# Patient Record
Sex: FEMALE | Race: WHITE | NOT HISPANIC OR LATINO
[De-identification: names, ages, dates, MRNs, and addresses within clinical notes are randomized per-mention and may not be internally consistent; named-entity substitution may affect disease eponyms.]

---

## 2017-02-13 PROBLEM — Z00.00 ENCOUNTER FOR PREVENTIVE HEALTH EXAMINATION: Status: ACTIVE | Noted: 2017-02-13

## 2017-02-23 ENCOUNTER — APPOINTMENT (OUTPATIENT)
Dept: CARDIOLOGY | Facility: CLINIC | Age: 67
End: 2017-02-23

## 2017-03-02 ENCOUNTER — APPOINTMENT (OUTPATIENT)
Dept: CARDIOLOGY | Facility: CLINIC | Age: 67
End: 2017-03-02

## 2017-03-03 ENCOUNTER — APPOINTMENT (OUTPATIENT)
Dept: CARDIOLOGY | Facility: CLINIC | Age: 67
End: 2017-03-03

## 2017-09-22 ENCOUNTER — APPOINTMENT (OUTPATIENT)
Dept: CARDIOLOGY | Facility: CLINIC | Age: 67
End: 2017-09-22
Payer: MEDICARE

## 2017-09-22 PROCEDURE — 99214 OFFICE O/P EST MOD 30 MIN: CPT

## 2021-12-27 ENCOUNTER — TRANSCRIPTION ENCOUNTER (OUTPATIENT)
Age: 71
End: 2021-12-27

## 2021-12-31 ENCOUNTER — TRANSCRIPTION ENCOUNTER (OUTPATIENT)
Age: 71
End: 2021-12-31

## 2022-11-09 ENCOUNTER — APPOINTMENT (OUTPATIENT)
Dept: ORTHOPEDIC SURGERY | Facility: CLINIC | Age: 72
End: 2022-11-09

## 2022-11-09 DIAGNOSIS — Z78.9 OTHER SPECIFIED HEALTH STATUS: ICD-10-CM

## 2022-11-09 PROCEDURE — 99203 OFFICE O/P NEW LOW 30 MIN: CPT

## 2022-11-09 RX ORDER — MELOXICAM 15 MG/1
15 TABLET ORAL
Qty: 30 | Refills: 1 | Status: ACTIVE | COMMUNITY
Start: 2022-11-09 | End: 1900-01-01

## 2022-11-09 NOTE — HISTORY OF PRESENT ILLNESS
[de-identified] : Patient presents for evaluation on LT hip pain. States no injury. This has been ongoing for the last 8 months. Presents using walker to help ambulate (dizziness since TBI in 2016) and presents with x-ray films. Describes the pain as soreness in the groin that has been ongoing since March. States she saw PCP who referred to Ortho. Reports decreased pain with taking ibuprofen in the morning.

## 2022-11-09 NOTE — PHYSICAL EXAM
[3___] : flexion 3[unfilled]/5 [] : uses walker [Left] : left hip with pelvis [AP] : anteroposterior [Lateral] : lateral [Outside films reviewed] : Outside films reviewed [Severe arthritis (Tonnis Grade 3)] : Severe arthritis (Tonnis Grade 3) [[id number]] : flexion 75 degrees

## 2022-11-09 NOTE — DISCUSSION/SUMMARY
[de-identified] : I reviewed patient's radiographs and discussed her condition and treatment options.  Defer total hip arthroplasty.  Discussed nonoperative management including starting PT, HEP, and daily NSAID.  Follow up in 6 weeks.  Patient voiced understanding and agreement with the plan.\par

## 2022-11-10 ENCOUNTER — APPOINTMENT (OUTPATIENT)
Dept: ORTHOPEDIC SURGERY | Facility: CLINIC | Age: 72
End: 2022-11-10

## 2022-11-15 ENCOUNTER — FORM ENCOUNTER (OUTPATIENT)
Age: 72
End: 2022-11-15

## 2022-12-14 ENCOUNTER — APPOINTMENT (OUTPATIENT)
Dept: ORTHOPEDIC SURGERY | Facility: CLINIC | Age: 72
End: 2022-12-14

## 2022-12-14 DIAGNOSIS — M16.12 UNILATERAL PRIMARY OSTEOARTHRITIS, LEFT HIP: ICD-10-CM

## 2022-12-14 PROCEDURE — 99213 OFFICE O/P EST LOW 20 MIN: CPT

## 2022-12-14 RX ORDER — MELOXICAM 15 MG/1
15 TABLET ORAL
Qty: 30 | Refills: 1 | Status: ACTIVE | COMMUNITY
Start: 2022-12-14 | End: 1900-01-01

## 2022-12-14 NOTE — PHYSICAL EXAM
[3___] : flexion 3[unfilled]/5 [Left] : left hip with pelvis [AP] : anteroposterior [Lateral] : lateral [Outside films reviewed] : Outside films reviewed [Severe arthritis (Tonnis Grade 3)] : Severe arthritis (Tonnis Grade 3) [] : no ecchymosis [[id number]] : flexion 75 degrees

## 2022-12-14 NOTE — HISTORY OF PRESENT ILLNESS
[de-identified] : Since last visit, patient presents for candidacy for hip surgery.  She saw another surgeon and would like to discuss next steps.

## 2022-12-14 NOTE — DISCUSSION/SUMMARY
[de-identified] : I reviewed patient's radiographs and discussed her condition and treatment options.  She is a candidate for total hip arthroplasty and I agreed with her proceeding with surgery with Dr. Zimmerman in February.  Patient voiced understanding and agreement with the plan.\par

## 2022-12-20 ENCOUNTER — FORM ENCOUNTER (OUTPATIENT)
Age: 72
End: 2022-12-20

## 2023-05-25 ENCOUNTER — OFFICE (OUTPATIENT)
Dept: URBAN - METROPOLITAN AREA CLINIC 8 | Facility: CLINIC | Age: 73
Setting detail: OPHTHALMOLOGY
End: 2023-05-25
Payer: MEDICARE

## 2023-05-25 DIAGNOSIS — H16.223: ICD-10-CM

## 2023-05-25 DIAGNOSIS — H00.15: ICD-10-CM

## 2023-05-25 DIAGNOSIS — H01.004: ICD-10-CM

## 2023-05-25 DIAGNOSIS — H25.13: ICD-10-CM

## 2023-05-25 DIAGNOSIS — H52.4: ICD-10-CM

## 2023-05-25 DIAGNOSIS — H01.001: ICD-10-CM

## 2023-05-25 DIAGNOSIS — H43.393: ICD-10-CM

## 2023-05-25 DIAGNOSIS — H35.373: ICD-10-CM

## 2023-05-25 DIAGNOSIS — H02.402: ICD-10-CM

## 2023-05-25 DIAGNOSIS — H43.812: ICD-10-CM

## 2023-05-25 PROCEDURE — 92015 DETERMINE REFRACTIVE STATE: CPT | Performed by: OPHTHALMOLOGY

## 2023-05-25 PROCEDURE — 92014 COMPRE OPH EXAM EST PT 1/>: CPT | Performed by: OPHTHALMOLOGY

## 2023-05-25 ASSESSMENT — AXIALLENGTH_DERIVED
OD_AL: 23.0628
OS_AL: 23.2086
OS_AL: 22.9753
OD_AL: 23.1562
OS_AL: 22.9753
OD_AL: 23.0628

## 2023-05-25 ASSESSMENT — REFRACTION_CURRENTRX
OS_OVR_VA: 20/
OD_AXIS: 090
OD_CYLINDER: -0.75
OS_SPHERE: +2.00
OS_AXIS: 108
OD_OVR_VA: 20/
OS_CYLINDER: -0.75
OS_ADD: +3.00
OS_VPRISM_DIRECTION: BF
OD_VPRISM_DIRECTION: BF
OD_SPHERE: +2.00
OD_ADD: +3.00

## 2023-05-25 ASSESSMENT — REFRACTION_MANIFEST
OD_AXIS: 085
OD_AXIS: 075
OD_VA1: 20/20-2
OD_CYLINDER: -0.75
OD_ADD: +3.00
OD_VA2: 20/20(J1+)
OU_VA: 20/20
OS_AXIS: 110
OD_ADD: +3.00
OS_VA1: 20/20
OS_SPHERE: +2.00
OS_CYLINDER: -0.75
OS_AXIS: 120
OS_ADD: +3.00
OS_VA2: 20/20(J1+)
OD_SPHERE: +2.00
OU_VA: 20/20
OD_SPHERE: +2.00
OS_VA1: 20/20
OS_SPHERE: +2.00
OS_CYLINDER: -0.75
OD_CYLINDER: -0.75
OS_VA2: 20/20(J1+)
OD_VA2: 20/20(J1+)
OS_ADD: +3.00
OD_VA1: 20/20-2

## 2023-05-25 ASSESSMENT — SPHEQUIV_DERIVED
OD_SPHEQUIV: 1.625
OD_SPHEQUIV: 1.375
OD_SPHEQUIV: 1.625
OS_SPHEQUIV: 1.625
OS_SPHEQUIV: 1.625
OS_SPHEQUIV: 1

## 2023-05-25 ASSESSMENT — LID EXAM ASSESSMENTS
OD_BLEPHARITIS: RUL 2+
OS_BLEPHARITIS: LUL 2+

## 2023-05-25 ASSESSMENT — KERATOMETRY
OS_K2POWER_DIOPTERS: 44.00
METHOD_AUTO_MANUAL: AUTO
OD_K2POWER_DIOPTERS: 43.50
OD_AXISANGLE_DEGREES: 110
OD_K1POWER_DIOPTERS: 43.00
OS_K1POWER_DIOPTERS: 43.00
OS_AXISANGLE_DEGREES: 094

## 2023-05-25 ASSESSMENT — LID POSITION - PTOSIS: OS_PTOSIS: LUL T

## 2023-05-25 ASSESSMENT — CONFRONTATIONAL VISUAL FIELD TEST (CVF)
OD_FINDINGS: FULL
OS_FINDINGS: FULL

## 2023-05-25 ASSESSMENT — REFRACTION_AUTOREFRACTION
OS_AXIS: 122
OS_CYLINDER: -0.50
OS_SPHERE: +1.25
OD_CYLINDER: -0.75
OD_AXIS: 073
OD_SPHERE: +1.75

## 2023-05-25 ASSESSMENT — SUPERFICIAL PUNCTATE KERATITIS (SPK)
OS_SPK: T
OD_SPK: T

## 2023-05-25 ASSESSMENT — VISUAL ACUITY
OS_BCVA: 20/20-2
OD_BCVA: 20/20

## 2024-05-30 ENCOUNTER — OFFICE (OUTPATIENT)
Dept: URBAN - METROPOLITAN AREA CLINIC 8 | Facility: CLINIC | Age: 74
Setting detail: OPHTHALMOLOGY
End: 2024-05-30
Payer: MEDICARE

## 2024-05-30 DIAGNOSIS — H35.373: ICD-10-CM

## 2024-05-30 DIAGNOSIS — B88.0: ICD-10-CM

## 2024-05-30 DIAGNOSIS — H02.402: ICD-10-CM

## 2024-05-30 DIAGNOSIS — H43.812: ICD-10-CM

## 2024-05-30 DIAGNOSIS — H25.13: ICD-10-CM

## 2024-05-30 DIAGNOSIS — H16.223: ICD-10-CM

## 2024-05-30 DIAGNOSIS — H52.4: ICD-10-CM

## 2024-05-30 DIAGNOSIS — H43.393: ICD-10-CM

## 2024-05-30 DIAGNOSIS — H01.001: ICD-10-CM

## 2024-05-30 DIAGNOSIS — H01.004: ICD-10-CM

## 2024-05-30 PROCEDURE — 92015 DETERMINE REFRACTIVE STATE: CPT | Performed by: OPHTHALMOLOGY

## 2024-05-30 PROCEDURE — 92014 COMPRE OPH EXAM EST PT 1/>: CPT | Performed by: OPHTHALMOLOGY

## 2024-05-30 PROCEDURE — 92134 CPTRZ OPH DX IMG PST SGM RTA: CPT | Performed by: OPHTHALMOLOGY

## 2024-05-30 ASSESSMENT — CONFRONTATIONAL VISUAL FIELD TEST (CVF)
OS_FINDINGS: FULL
OD_FINDINGS: FULL

## 2024-05-30 ASSESSMENT — LID EXAM ASSESSMENTS
OS_BLEPHARITIS: LUL 2+
OD_BLEPHARITIS: RUL 2+

## 2024-05-30 ASSESSMENT — LID POSITION - PTOSIS: OS_PTOSIS: LUL T

## 2025-06-02 ENCOUNTER — OFFICE (OUTPATIENT)
Dept: URBAN - METROPOLITAN AREA CLINIC 8 | Facility: CLINIC | Age: 75
Setting detail: OPHTHALMOLOGY
End: 2025-06-02
Payer: MEDICARE

## 2025-06-02 DIAGNOSIS — H43.393: ICD-10-CM

## 2025-06-02 DIAGNOSIS — H01.004: ICD-10-CM

## 2025-06-02 DIAGNOSIS — H01.001: ICD-10-CM

## 2025-06-02 DIAGNOSIS — H16.223: ICD-10-CM

## 2025-06-02 DIAGNOSIS — H35.373: ICD-10-CM

## 2025-06-02 DIAGNOSIS — B88.0: ICD-10-CM

## 2025-06-02 DIAGNOSIS — H02.402: ICD-10-CM

## 2025-06-02 DIAGNOSIS — H52.4: ICD-10-CM

## 2025-06-02 DIAGNOSIS — H43.812: ICD-10-CM

## 2025-06-02 DIAGNOSIS — H25.13: ICD-10-CM

## 2025-06-02 PROCEDURE — 92134 CPTRZ OPH DX IMG PST SGM RTA: CPT | Performed by: OPHTHALMOLOGY

## 2025-06-02 PROCEDURE — 92015 DETERMINE REFRACTIVE STATE: CPT | Performed by: OPHTHALMOLOGY

## 2025-06-02 PROCEDURE — 92014 COMPRE OPH EXAM EST PT 1/>: CPT | Performed by: OPHTHALMOLOGY

## 2025-06-02 ASSESSMENT — REFRACTION_MANIFEST
OU_VA: 20/20-
OD_CYLINDER: -0.25
OS_VA2: 20/20(J1+)
OS_ADD: +3.00
OD_AXIS: 075
OD_VA2: 20/20(J1+)
OS_AXIS: 115
OS_SPHERE: +1.00
OS_CYLINDER: -0.25
OD_VA1: 20/20
OS_VA2: 20/20(J1+)
OS_SPHERE: +1.00
OU_VA: 20/20-
OD_ADD: +3.00
OD_AXIS: 075
OS_ADD: +3.00
OS_VA1: 20/25-2
OS_CYLINDER: -0.25
OD_ADD: +3.00
OD_SPHERE: +1.50
OS_AXIS: 115
OS_VA1: 20/25-2
OD_VA1: 20/20
OD_CYLINDER: -0.25
OD_VA2: 20/20(J1+)
OD_SPHERE: +1.50

## 2025-06-02 ASSESSMENT — REFRACTION_CURRENTRX
OS_CYLINDER: -0.75
OS_ADD: +3.00
OS_SPHERE: +2.25
OS_VPRISM_DIRECTION: BF
OD_AXIS: 090
OS_CYLINDER: -0.75
OD_OVR_VA: 20/
OS_SPHERE: +2.00
OS_OVR_VA: 20/
OD_AXIS: 077
OD_SPHERE: +2.00
OS_ADD: +3.00
OD_CYLINDER: -0.75
OD_SPHERE: +2.00
OS_OVR_VA: 20/
OD_ADD: +3.00
OS_VPRISM_DIRECTION: BF
OD_OVR_VA: 20/
OD_CYLINDER: -0.75
OD_VPRISM_DIRECTION: BF
OS_AXIS: 108
OD_VPRISM_DIRECTION: BF
OD_ADD: +3.00
OS_AXIS: 124

## 2025-06-02 ASSESSMENT — REFRACTION_AUTOREFRACTION
OS_SPHERE: +1.00
OS_CYLINDER: -0.50
OD_CYLINDER: -0.75
OS_AXIS: 113
OD_SPHERE: +1.25
OD_AXIS: 074

## 2025-06-02 ASSESSMENT — LID EXAM ASSESSMENTS
OS_BLEPHARITIS: LUL 2+
OD_BLEPHARITIS: RUL 2+

## 2025-06-02 ASSESSMENT — KERATOMETRY
OD_AXISANGLE_DEGREES: 113
OD_K2POWER_DIOPTERS: 43.25
OS_K2POWER_DIOPTERS: 43.75
OS_AXISANGLE_DEGREES: 072
METHOD_AUTO_MANUAL: AUTO
OS_K1POWER_DIOPTERS: 42.75
OD_K1POWER_DIOPTERS: 42.75

## 2025-06-02 ASSESSMENT — CONFRONTATIONAL VISUAL FIELD TEST (CVF)
OD_FINDINGS: FULL
OS_FINDINGS: FULL

## 2025-06-02 ASSESSMENT — SUPERFICIAL PUNCTATE KERATITIS (SPK)
OD_SPK: T
OS_SPK: T

## 2025-06-02 ASSESSMENT — VISUAL ACUITY
OS_BCVA: 20/20-
OD_BCVA: 20/30

## 2025-06-02 ASSESSMENT — LID POSITION - PTOSIS: OS_PTOSIS: LUL T
